# Patient Record
Sex: FEMALE | Race: WHITE | NOT HISPANIC OR LATINO | Employment: FULL TIME | URBAN - METROPOLITAN AREA
[De-identification: names, ages, dates, MRNs, and addresses within clinical notes are randomized per-mention and may not be internally consistent; named-entity substitution may affect disease eponyms.]

---

## 2023-08-09 LAB
ALANINE AMINOTRANSFERASE (SGPT) (U/L) IN SER/PLAS: 29 U/L (ref 7–45)
ALBUMIN (G/DL) IN SER/PLAS: 4.2 G/DL (ref 3.4–5)
ALKALINE PHOSPHATASE (U/L) IN SER/PLAS: 51 U/L (ref 33–110)
ANION GAP IN SER/PLAS: 12 MMOL/L (ref 10–20)
ASPARTATE AMINOTRANSFERASE (SGOT) (U/L) IN SER/PLAS: 24 U/L (ref 9–39)
BASOPHILS (10*3/UL) IN BLOOD BY AUTOMATED COUNT: 0.04 X10E9/L (ref 0–0.1)
BASOPHILS/100 LEUKOCYTES IN BLOOD BY AUTOMATED COUNT: 0.6 % (ref 0–2)
BILIRUBIN TOTAL (MG/DL) IN SER/PLAS: 0.7 MG/DL (ref 0–1.2)
CALCIUM (MG/DL) IN SER/PLAS: 8.9 MG/DL (ref 8.6–10.3)
CARBON DIOXIDE, TOTAL (MMOL/L) IN SER/PLAS: 24 MMOL/L (ref 21–32)
CHLORIDE (MMOL/L) IN SER/PLAS: 107 MMOL/L (ref 98–107)
CHOLESTEROL (MG/DL) IN SER/PLAS: 197 MG/DL (ref 0–199)
CHOLESTEROL IN HDL (MG/DL) IN SER/PLAS: 46.3 MG/DL
CHOLESTEROL/HDL RATIO: 4.3
CREATININE (MG/DL) IN SER/PLAS: 0.62 MG/DL (ref 0.5–1.05)
EOSINOPHILS (10*3/UL) IN BLOOD BY AUTOMATED COUNT: 0.17 X10E9/L (ref 0–0.7)
EOSINOPHILS/100 LEUKOCYTES IN BLOOD BY AUTOMATED COUNT: 2.4 % (ref 0–6)
ERYTHROCYTE DISTRIBUTION WIDTH (RATIO) BY AUTOMATED COUNT: 12.9 % (ref 11.5–14.5)
ERYTHROCYTE MEAN CORPUSCULAR HEMOGLOBIN CONCENTRATION (G/DL) BY AUTOMATED: 34.1 G/DL (ref 32–36)
ERYTHROCYTE MEAN CORPUSCULAR VOLUME (FL) BY AUTOMATED COUNT: 94 FL (ref 80–100)
ERYTHROCYTES (10*6/UL) IN BLOOD BY AUTOMATED COUNT: 4.12 X10E12/L (ref 4–5.2)
GFR FEMALE: >90 ML/MIN/1.73M2
GLUCOSE (MG/DL) IN SER/PLAS: 99 MG/DL (ref 74–99)
HEMATOCRIT (%) IN BLOOD BY AUTOMATED COUNT: 38.7 % (ref 36–46)
HEMOGLOBIN (G/DL) IN BLOOD: 13.2 G/DL (ref 12–16)
HEPATITIS C VIRUS AB PRESENCE IN SERUM: NONREACTIVE
HIV 1/ 2 AG/AB SCREEN: NONREACTIVE
IMMATURE GRANULOCYTES/100 LEUKOCYTES IN BLOOD BY AUTOMATED COUNT: 0.6 % (ref 0–0.9)
LDL: 102 MG/DL (ref 0–99)
LEUKOCYTES (10*3/UL) IN BLOOD BY AUTOMATED COUNT: 7.1 X10E9/L (ref 4.4–11.3)
LYMPHOCYTES (10*3/UL) IN BLOOD BY AUTOMATED COUNT: 2.24 X10E9/L (ref 1.2–4.8)
LYMPHOCYTES/100 LEUKOCYTES IN BLOOD BY AUTOMATED COUNT: 31.8 % (ref 13–44)
MONOCYTES (10*3/UL) IN BLOOD BY AUTOMATED COUNT: 0.42 X10E9/L (ref 0.1–1)
MONOCYTES/100 LEUKOCYTES IN BLOOD BY AUTOMATED COUNT: 6 % (ref 2–10)
NEUTROPHILS (10*3/UL) IN BLOOD BY AUTOMATED COUNT: 4.14 X10E9/L (ref 1.2–7.7)
NEUTROPHILS/100 LEUKOCYTES IN BLOOD BY AUTOMATED COUNT: 58.6 % (ref 40–80)
NON HDL CHOLESTEROL: 151 MG/DL
PLATELETS (10*3/UL) IN BLOOD AUTOMATED COUNT: 332 X10E9/L (ref 150–450)
POTASSIUM (MMOL/L) IN SER/PLAS: 4.2 MMOL/L (ref 3.5–5.3)
PROTEIN TOTAL: 6.9 G/DL (ref 6.4–8.2)
SODIUM (MMOL/L) IN SER/PLAS: 139 MMOL/L (ref 136–145)
THYROTROPIN (MIU/L) IN SER/PLAS BY DETECTION LIMIT <= 0.05 MIU/L: 1.22 MIU/L (ref 0.44–3.98)
TRIGLYCERIDE (MG/DL) IN SER/PLAS: 245 MG/DL (ref 0–149)
UREA NITROGEN (MG/DL) IN SER/PLAS: 13 MG/DL (ref 6–23)
VLDL: 49 MG/DL (ref 0–40)

## 2023-10-10 ENCOUNTER — TELEPHONE (OUTPATIENT)
Dept: RADIOLOGY | Facility: HOSPITAL | Age: 43
End: 2023-10-10
Payer: COMMERCIAL

## 2023-10-10 ENCOUNTER — HOSPITAL ENCOUNTER (OUTPATIENT)
Dept: RADIOLOGY | Facility: HOSPITAL | Age: 43
Discharge: HOME | End: 2023-10-10
Payer: COMMERCIAL

## 2023-10-10 DIAGNOSIS — R92.8 OTHER ABNORMAL AND INCONCLUSIVE FINDINGS ON DIAGNOSTIC IMAGING OF BREAST: ICD-10-CM

## 2023-10-10 DIAGNOSIS — R92.1 BREAST CALCIFICATION, LEFT: Primary | ICD-10-CM

## 2023-10-10 PROCEDURE — 77065 DX MAMMO INCL CAD UNI: CPT | Mod: LT

## 2023-10-10 PROCEDURE — 77061 BREAST TOMOSYNTHESIS UNI: CPT | Mod: LEFT SIDE

## 2023-10-10 PROCEDURE — 77065 DX MAMMO INCL CAD UNI: CPT | Mod: LEFT SIDE

## 2023-10-10 NOTE — TELEPHONE ENCOUNTER
After patient review of diagnostic results with Dr. Jones, referred to this RN for navigation assistance. Literature regarding abnormal breast imaging and breast biopsy including what to expect before, during, and after the procedure reviewed with the patient and sent to preferred email after call. All questions answered. Patient selected Dr. Neri for surgical consultation 10/17 0930 with biopsy to follow 10/18 1300. Appointment information provided and reviewed both verbally and sent to patient preferred email, to include provider information and how to reach me directly with questions or concerns before concluding visit.

## 2023-10-15 PROBLEM — K21.9 GASTROESOPHAGEAL REFLUX DISEASE WITHOUT ESOPHAGITIS: Status: ACTIVE | Noted: 2023-09-30

## 2023-10-15 PROBLEM — R92.8 ABNORMAL MAMMOGRAM: Status: ACTIVE | Noted: 2023-09-30

## 2023-10-15 PROBLEM — R09.89 CHOKING IN ADULT: Status: ACTIVE | Noted: 2023-09-30

## 2023-10-15 PROBLEM — Z90.710 S/P LAPAROSCOPIC ASSISTED VAGINAL HYSTERECTOMY (LAVH): Status: ACTIVE | Noted: 2017-12-21

## 2023-10-15 PROBLEM — R63.5 WEIGHT GAIN: Status: ACTIVE | Noted: 2023-08-29

## 2023-10-15 PROBLEM — D17.1 LIPOMA OF TORSO: Status: ACTIVE | Noted: 2023-08-29

## 2023-10-15 RX ORDER — FAMOTIDINE 20 MG/1
20 TABLET, FILM COATED ORAL 2 TIMES DAILY
COMMUNITY
Start: 2023-08-21 | End: 2024-04-30 | Stop reason: WASHOUT

## 2023-10-15 RX ORDER — OMEPRAZOLE 20 MG/1
20 CAPSULE, DELAYED RELEASE ORAL
COMMUNITY
Start: 2023-09-13 | End: 2023-10-17 | Stop reason: ALTCHOICE

## 2023-10-15 RX ORDER — FLUTICASONE PROPIONATE 50 MCG
2 SPRAY, SUSPENSION (ML) NASAL DAILY
COMMUNITY
Start: 2023-03-15 | End: 2023-10-17 | Stop reason: ALTCHOICE

## 2023-10-15 RX ORDER — NAPROXEN 500 MG/1
500 TABLET ORAL
COMMUNITY
Start: 2023-08-21 | End: 2023-10-17 | Stop reason: ALTCHOICE

## 2023-10-15 RX ORDER — ALBUTEROL SULFATE 90 UG/1
2 AEROSOL, METERED RESPIRATORY (INHALATION) EVERY 6 HOURS PRN
COMMUNITY
Start: 2022-12-15 | End: 2023-12-15

## 2023-10-15 RX ORDER — FLUTICASONE PROPIONATE 110 UG/1
1 AEROSOL, METERED RESPIRATORY (INHALATION) 2 TIMES DAILY
COMMUNITY
Start: 2023-03-15 | End: 2023-10-17 | Stop reason: ALTCHOICE

## 2023-10-15 RX ORDER — IBUPROFEN 600 MG/1
600 TABLET ORAL 3 TIMES DAILY PRN
COMMUNITY
Start: 2023-09-18 | End: 2023-11-17

## 2023-10-17 ENCOUNTER — OFFICE VISIT (OUTPATIENT)
Dept: SURGERY | Facility: CLINIC | Age: 43
End: 2023-10-17
Payer: COMMERCIAL

## 2023-10-17 VITALS
SYSTOLIC BLOOD PRESSURE: 130 MMHG | DIASTOLIC BLOOD PRESSURE: 87 MMHG | BODY MASS INDEX: 33.06 KG/M2 | HEART RATE: 97 BPM | OXYGEN SATURATION: 98 % | WEIGHT: 186.6 LBS | HEIGHT: 63 IN

## 2023-10-17 DIAGNOSIS — R92.8 ABNORMAL MAMMOGRAM: ICD-10-CM

## 2023-10-17 DIAGNOSIS — R92.1 CALCIFICATION OF LEFT BREAST: Primary | ICD-10-CM

## 2023-10-17 DIAGNOSIS — Z91.89 AT HIGH RISK FOR BREAST CANCER: ICD-10-CM

## 2023-10-17 DIAGNOSIS — R92.30 DENSE BREAST TISSUE: ICD-10-CM

## 2023-10-17 DIAGNOSIS — N63.22 MASS OF UPPER INNER QUADRANT OF LEFT BREAST: ICD-10-CM

## 2023-10-17 PROCEDURE — 99204 OFFICE O/P NEW MOD 45 MIN: CPT | Performed by: SURGERY

## 2023-10-17 PROCEDURE — 1036F TOBACCO NON-USER: CPT | Performed by: SURGERY

## 2023-10-17 ASSESSMENT — ENCOUNTER SYMPTOMS
VOMITING: 0
EYE PAIN: 0
CONFUSION: 0
BRUISES/BLEEDS EASILY: 0
ABDOMINAL PAIN: 0
WOUND: 0
FREQUENCY: 0
SPEECH DIFFICULTY: 0
EYE REDNESS: 0
MYALGIAS: 0
ARTHRALGIAS: 0
FATIGUE: 0
COUGH: 0
HEADACHES: 0
WEAKNESS: 0
SHORTNESS OF BREATH: 0
HEMATURIA: 0
FLANK PAIN: 0
CONSTIPATION: 0
AGITATION: 0
POLYPHAGIA: 0
NAUSEA: 0
DYSURIA: 0
CHILLS: 0
FEVER: 0
DIARRHEA: 0

## 2023-10-17 NOTE — PROGRESS NOTES
Subjective     Patient ID: Stacy Alexander is a 43 y.o. female who presents for New Patient Visit (Patient was referred by Dr. Tejeda for abnormal left breast mammogram. Patient states she has a lump that she is unsure how long it has been there. ).    RAFAEL Kumar is a 43-year-old white female who I am seen in consultation at the request of her primary care physician for evaluation of an abnormal left screening mammogram.  She currently denies any breast complaints.  No palpable masses, no breast tenderness and no nipple discharge.  However, she does note that she has been having anterior lower right rib pain which worsens when she takes a deep breath.  She reports having a chest x-ray which was negative, but continues to work with her primary care physician for this.  She states that she has been able to feel a small left breast lump just above and medial to her left nipple/areola.  She does not feel that it is growing in size.  She underwent a screening mammogram in September which identified a tight grouping of punctate calcifications just above and to the right of the left nipple.  She does have diffuse calcifications of the left breast as well.  A stereotactic biopsy was recommended after diagnostic mammogram confirmed the type grouping of punctate calcifications measuring approximately 0.6 cm.    Risk factors for breast cancer: 43-year-old white female; menarche at age 11; first live birth at age 28; no previous breast biopsy; mother was diagnosed with ER positive breast cancer when she was 71.  She also has a father with prostate cancer and a maternal aunt who had ovarian cancer in her mid 50s.  She is premenopausal (has undergone a FRANCO and unilateral salpingo-oophorectomy), and never took any hormone replacement therapy.  She had previously undergone genetic testing due to her son's illness and knows that she is BRCA negative.  She had been followed at a high risk breast clinic in [facility], but is now switching her  care over to .  This gives her a 5-year Kate score of 1.5% and a lifetime risk of 18.2% which overall puts her in a high risk category.  Using the [facility] Breast cancer risk evaluation tool, this patient's 10-year risk of breast cancer is 7.5% ( average risk is 2.1%) and lifetime risk is 32.8% (average lifetime risk is 10.4%). This puts her in a high risk category for developing breast cancer.    Review of Systems   Constitutional:  Negative for chills, fatigue and fever.   HENT:  Negative for congestion, ear pain and hearing loss.    Eyes:  Negative for pain and redness.   Respiratory:  Negative for cough and shortness of breath.    Cardiovascular:  Negative for chest pain and leg swelling.   Gastrointestinal:  Negative for abdominal pain, constipation, diarrhea, nausea and vomiting.   Endocrine: Negative for polyphagia.   Genitourinary:  Negative for dysuria, flank pain, frequency and hematuria.   Musculoskeletal:  Negative for arthralgias and myalgias.   Skin:  Negative for rash and wound.   Allergic/Immunologic: Negative for immunocompromised state.   Neurological:  Negative for speech difficulty, weakness and headaches.   Hematological:  Does not bruise/bleed easily.   Psychiatric/Behavioral:  Negative for agitation and confusion.          Objective   Physical Exam  General: Well-developed, well-nourished and in no acute distress.  Head: Normocephalic. Atraumatic.  Neck/thyroid: Neck is supple.   Eyes: Pupils equal round and reactive to light. Conjunctiva normal.  ENMT: No masses or deformity of external nose. External ears without masses.  Respiratory/Chest:  Normal respiratory effort.  Breast: Moderate sized breasts.  No palpable abnormality of the right.  Just above and medial to the left nipple there is an area of increased density with difficult to distinguish edges.  No erythema.  No nipple discharge.  Lymphatics: No palpable lymphadenopathy of the cervical, supraclavicular or axillary  regions.  Cardiovascular: Regular rate and rhythm.   Abdomen: Soft, nontender, nondistended.   Musculoskeletal: Joints and limbs are grossly normal. Normal gait. Normal range of motion of major joints.  Neuro: Oriented to person, place and time. No obvious neurological deficit. Motor strength grossly normal.  Psych: Normal mood and affect.      Radiology Resutls  Narrative & Impression   Interpreted By:  Raiza Jones,   STUDY:  BI MAMMO LEFT DIAGNOSTIC TOMOSYNTHESIS;  10/10/2023 10:08 am      ACCESSION NUMBER(S):  [id number]      ORDERING CLINICIAN:  RAIZA JONES      INDICATION:  Signs/Symptoms:CAT 0 LEFT. Left breast calcifications          COMPARISON:  08/21/2023 and 05/28/2020      FINDINGS:  2D and tomosynthesis images were reviewed at 1 mm slice thickness.      Density:  The breast tissue is heterogeneously dense, which may  obscure small masses.      A tight grouping of fine punctate calcifications is identified  slightly superior and medial to the nipple axis at anterior to middle  depth. Area involved by the calcifications is about 0.6 cm diameter.  There is no associated mass or architectural distortion. These  calcifications are distinct from the diffusely scattered punctate  calcifications. Surgical referral and biopsy are recommended. I  discussed my recommendation with the patient. Through our nurse  navigator arrangements were made for the patient to see Dr. Neri  10/17/2023 with biopsy 10/18/2023.      IMPRESSION:  Biopsy is recommended for a grouping of calcifications in the left  breast      BI-RADS CATEGORY:      BI-RADS Category:  4 Suspicious.  Recommendation:  Biopsy.  Recommended Date:  Immediate.  Laterality:  Left.      For any future breast imaging appointments, please call [phone]  ([phone]).          MACRO:  None      Signed by: Raiza Jones 10/10/2023 2:24 PM  Dictation workstation:   [id number]         Assessment/Plan   Diagnoses and all orders for this  "visit:  Calcification of left breast  Dense breast tissue  Abnormal mammogram  Mass of upper inner quadrant of left breast  At high risk for breast cancer    Oct 2023: LEFT; 0.6cm tight, grouping of punctate calcs just above and medial to nipple     1.  Reviewed with the patient that although she does have diffuse calcifications of the left breast, the tightly grouped punctate calcifications just above and medial to the left nipple are concerning for an underlying mass.  Therefore, agree with proceeding with the stereotactic biopsy scheduled for 10/18/2023.  The process of the biopsy was reviewed with the patient.  2.  She will follow-up with a phone call on 10/24/2023 to review the biopsy results.  If she does require surgical intervention, would still recommend mag seed lumpectomy as it is not clear that the area of \"palpable abnormality\" correlates with the underlying calcifications.  3.  She has high risk factors for breast cancer.  Her lifetime risk is 32.8%.  Therefore, she would qualify for more frequent screening with yearly mammograms and yearly breast MRIs alternating every 6 months.  We will hold on scheduling any follow-up at this time until the stereotactic biopsy has resulted.  Also reviewed with the patient regarding possible chemo preventative medications due to the high risk of breast cancer.  4.  She has already been seen by genetics due to her son's illness and has been tested negative for the BRCA gene per patient report.  5.  Because of her dense breast tissue, discussed the importance of self breast exams in the early detection of breast cancer.      "

## 2023-10-17 NOTE — PATIENT INSTRUCTIONS
1.  Keep your appointment for your mammogram guided left breast biopsy scheduled for 10/18/2023.  This biopsy is performed in the radiology department of [facility].  2.  If you have any problems with the biopsy site (bleeding or concern for infection), please contact Dr. Neri's office.  [phone]  3.  Follow-up with a virtual appointment with Dr. Neri on 10/24/2023 to review your biopsy results.  4.  Please read the booklet regarding high risk screening for breast cancer.  We will discuss possible breast MRI in 6 months for screening purposes.  5.  You are encouraged to do monthly self breast exams.  If you identify any abnormalities, please contact Dr. Neri's office immediately.

## 2023-10-17 NOTE — LETTER
"October 17, 2023     Fiona Tejeda MD  [id number]  [address]  [facility] [address]    Patient: Stacy Alexander   YOB: 1980   Date of Visit: 10/17/2023       Dear Dr. Fiona Tejeda MD:    Thank you for referring Stacy Alexander to me for evaluation. Below are the relevant portions of my assessment and plan of care.    Assessment / Plan:  Diagnoses and all orders for this visit:  Calcification of left breast  Dense breast tissue  Abnormal mammogram  Mass of upper inner quadrant of left breast  At high risk for breast cancer    Oct 2023: LEFT; 0.6cm tight, grouping of punctate calcs just above and medial to nipple     1.  Reviewed with the patient that although she does have diffuse calcifications of the left breast, the tightly grouped punctate calcifications just above and medial to the left nipple are concerning for an underlying mass.  Therefore, agree with proceeding with the stereotactic biopsy scheduled for 10/18/2023.  The process of the biopsy was reviewed with the patient.  2.  She will follow-up with a phone call on 10/24/2023 to review the biopsy results.  If she does require surgical intervention, would still recommend mag seed lumpectomy as it is not clear that the area of \"palpable abnormality\" correlates with the underlying calcifications.  3.  She has high risk factors for breast cancer.  Her lifetime risk is 32.8%.  Therefore, she would qualify for more frequent screening with yearly mammograms and yearly breast MRIs alternating every 6 months.  We will hold on scheduling any follow-up at this time until the stereotactic biopsy has resulted.  Also reviewed with the patient regarding possible chemo preventative medications due to the high risk of breast cancer.  4.  She has already been seen by genetics due to her son's illness and has been tested negative for the BRCA gene per patient report.  5.  Because of her dense breast tissue, discussed the importance of self breast exams in the early " detection of breast cancer.        If you have questions, please do not hesitate to call me. I look forward to following Stacy along with you.         Sincerely,        Merissa Neri MD        CC: No Recipients

## 2023-10-18 ENCOUNTER — HOSPITAL ENCOUNTER (OUTPATIENT)
Dept: RADIOLOGY | Facility: HOSPITAL | Age: 43
Discharge: HOME | End: 2023-10-18
Payer: COMMERCIAL

## 2023-10-18 DIAGNOSIS — R92.1 CALCIFICATION OF LEFT BREAST: ICD-10-CM

## 2023-10-18 DIAGNOSIS — R92.1 BREAST CALCIFICATION, LEFT: ICD-10-CM

## 2023-10-18 PROCEDURE — 77065 DX MAMMO INCL CAD UNI: CPT | Mod: LEFT SIDE | Performed by: RADIOLOGY

## 2023-10-18 PROCEDURE — 88305 TISSUE EXAM BY PATHOLOGIST: CPT | Mod: TC,SUR,PORLAB | Performed by: SURGERY

## 2023-10-18 PROCEDURE — 19081 BX BREAST 1ST LESION STRTCTC: CPT | Mod: LT

## 2023-10-18 PROCEDURE — 19081 BX BREAST 1ST LESION STRTCTC: CPT | Mod: LEFT SIDE | Performed by: RADIOLOGY

## 2023-10-18 PROCEDURE — 88305 TISSUE EXAM BY PATHOLOGIST: CPT | Mod: TC,PORLAB | Performed by: SURGERY

## 2023-10-18 PROCEDURE — 2720000007 HC OR 272 NO HCPCS

## 2023-10-18 PROCEDURE — 88305 TISSUE EXAM BY PATHOLOGIST: CPT | Performed by: STUDENT IN AN ORGANIZED HEALTH CARE EDUCATION/TRAINING PROGRAM

## 2023-10-18 PROCEDURE — 77065 DX MAMMO INCL CAD UNI: CPT

## 2023-10-24 ENCOUNTER — APPOINTMENT (OUTPATIENT)
Dept: SURGERY | Facility: CLINIC | Age: 43
End: 2023-10-24
Payer: COMMERCIAL

## 2023-10-25 ENCOUNTER — OFFICE VISIT (OUTPATIENT)
Dept: SURGERY | Facility: CLINIC | Age: 43
End: 2023-10-25
Payer: COMMERCIAL

## 2023-10-25 VITALS
OXYGEN SATURATION: 98 % | SYSTOLIC BLOOD PRESSURE: 127 MMHG | BODY MASS INDEX: 33.45 KG/M2 | HEART RATE: 83 BPM | DIASTOLIC BLOOD PRESSURE: 85 MMHG | HEIGHT: 63 IN | WEIGHT: 188.8 LBS

## 2023-10-25 DIAGNOSIS — R92.30 DENSE BREAST TISSUE: ICD-10-CM

## 2023-10-25 DIAGNOSIS — R92.8 ABNORMAL MAMMOGRAM: ICD-10-CM

## 2023-10-25 DIAGNOSIS — R92.1 CALCIFICATION OF LEFT BREAST: Primary | ICD-10-CM

## 2023-10-25 DIAGNOSIS — N63.22 MASS OF UPPER INNER QUADRANT OF LEFT BREAST: ICD-10-CM

## 2023-10-25 DIAGNOSIS — Z91.89 AT HIGH RISK FOR BREAST CANCER: ICD-10-CM

## 2023-10-25 DIAGNOSIS — T30.0 SKIN BURN: ICD-10-CM

## 2023-10-25 LAB
LABORATORY COMMENT REPORT: NORMAL
PATH REPORT.COMMENTS IMP SPEC: NORMAL
PATH REPORT.FINAL DX SPEC: NORMAL
PATH REPORT.GROSS SPEC: NORMAL
PATH REPORT.RELEVANT HX SPEC: NORMAL
PATH REPORT.TOTAL CANCER: NORMAL

## 2023-10-25 PROCEDURE — 99213 OFFICE O/P EST LOW 20 MIN: CPT | Performed by: SURGERY

## 2023-10-25 PROCEDURE — 1036F TOBACCO NON-USER: CPT | Performed by: SURGERY

## 2023-10-25 RX ORDER — SILVER SULFADIAZINE 10 G/1000G
CREAM TOPICAL 2 TIMES DAILY
Qty: 50 G | Refills: 3 | Status: SHIPPED | OUTPATIENT
Start: 2023-10-25 | End: 2023-11-24

## 2023-10-25 ASSESSMENT — ENCOUNTER SYMPTOMS
FLANK PAIN: 0
EYE PAIN: 0
VOMITING: 0
DYSURIA: 0
FATIGUE: 0
HEADACHES: 0
EYE REDNESS: 0
ABDOMINAL PAIN: 0
SPEECH DIFFICULTY: 0
CONSTIPATION: 0
BRUISES/BLEEDS EASILY: 0
AGITATION: 0
WOUND: 0
FREQUENCY: 0
MYALGIAS: 0
FEVER: 0
CHILLS: 0
DIARRHEA: 0
POLYPHAGIA: 0
WEAKNESS: 0
HEMATURIA: 0
NAUSEA: 0
ARTHRALGIAS: 0
CONFUSION: 0
COUGH: 0
SHORTNESS OF BREATH: 0

## 2023-10-25 NOTE — PROGRESS NOTES
Subjective     Patient ID: Stacy Alexander is a 43 y.o. female who presents for Follow-up (Patient has biopsy done 10-18.  Patient states left breast was burning blistered with bleeding. Patient states when she had the biopsy her nipple had discharge when it was smashed came out clear yellow greenish in color.).    HPI  Stacy returns to the office after undergoing a core needle biopsy for left breast calcifications.  She states that she applied the ice pack to the biopsy site for 20 minutes on and 20 minutes off after the biopsy.  She started to feel a burning sensation when the ice pack was in place, and quickly took this off.  Within 3 days after her biopsy, she had blisters around the biopsy site which eventually opened.  She has been placing a Neosporin like medication on the area with a moderate amount of drainage.  The pain is somewhat improved.    Risk factors for breast cancer: 43-year-old white female; menarche at age 11; first live birth at age 28; no previous breast biopsy; mother was diagnosed with ER positive breast cancer when she was 71.  She also has a father with prostate cancer and a maternal aunt who had ovarian cancer in her mid 50s.  She is premenopausal (has undergone a FRANCO and unilateral salpingo-oophorectomy), and never took any hormone replacement therapy.  She had previously undergone genetic testing due to her son's illness and knows that she is BRCA negative.  She had been followed at a high risk breast clinic in [facility], but is now switching her care over to .  This gives her a 5-year Kate score of 1.5% and a lifetime risk of 18.2% which overall puts her in a high risk category.  Using the [facility] Breast cancer risk evaluation tool, this patient's 10-year risk of breast cancer is 7.5% ( average risk is 2.1%) and lifetime risk is 32.8% (average lifetime risk is 10.4%). This puts her in a high risk category for developing breast cancer.    Review of Systems   Constitutional:  Negative  for chills, fatigue and fever.   HENT:  Negative for congestion, ear pain and hearing loss.    Eyes:  Negative for pain and redness.   Respiratory:  Negative for cough and shortness of breath.    Cardiovascular:  Negative for chest pain and leg swelling.   Gastrointestinal:  Negative for abdominal pain, constipation, diarrhea, nausea and vomiting.   Endocrine: Negative for polyphagia.   Genitourinary:  Negative for dysuria, flank pain, frequency and hematuria.   Musculoskeletal:  Negative for arthralgias and myalgias.   Skin:  Negative for rash and wound.   Allergic/Immunologic: Negative for immunocompromised state.   Neurological:  Negative for speech difficulty, weakness and headaches.   Hematological:  Does not bruise/bleed easily.   Psychiatric/Behavioral:  Negative for agitation and confusion.          Objective   Physical Exam  General: Well-developed, well-nourished and in no acute distress.  Head: Normocephalic. Atraumatic.  Neck/thyroid: Neck is supple.   Eyes: Pupils equal round and reactive to light. Conjunctiva normal.  ENMT: No masses or deformity of external nose. External ears without masses.  Respiratory/Chest:  Normal respiratory effort.  Breast: Moderate sized breasts.  No palpable abnormality of the right.  Just above and medial to the left nipple there are superficial open wounds measuring at least 3 x 4 cm (2 wounds) with a few smaller areas and some blistering.  No evidence of cellulitis.  Lymphatics: No palpable lymphadenopathy of the cervical, supraclavicular or axillary regions.  Cardiovascular: Regular rate and rhythm.   Abdomen: Soft, nontender, nondistended.   Musculoskeletal: Joints and limbs are grossly normal. Normal gait. Normal range of motion of major joints.  Neuro: Oriented to person, place and time. No obvious neurological deficit. Motor strength grossly normal.  Psych: Normal mood and affect.      Radiology Resutls  Narrative & Impression   Interpreted By:  Mabel Jones    STUDY:  BI MAMMO LEFT DIAGNOSTIC TOMOSYNTHESIS;  10/10/2023 10:08 am      ACCESSION NUMBER(S):  [id number]      ORDERING CLINICIAN:  RAIZA JONES      INDICATION:  Signs/Symptoms:CAT 0 LEFT. Left breast calcifications          COMPARISON:  08/21/2023 and 05/28/2020      FINDINGS:  2D and tomosynthesis images were reviewed at 1 mm slice thickness.      Density:  The breast tissue is heterogeneously dense, which may  obscure small masses.      A tight grouping of fine punctate calcifications is identified  slightly superior and medial to the nipple axis at anterior to middle  depth. Area involved by the calcifications is about 0.6 cm diameter.  There is no associated mass or architectural distortion. These  calcifications are distinct from the diffusely scattered punctate  calcifications. Surgical referral and biopsy are recommended. I  discussed my recommendation with the patient. Through our nurse  navigator arrangements were made for the patient to see Dr. Neri  10/17/2023 with biopsy 10/18/2023.      IMPRESSION:  Biopsy is recommended for a grouping of calcifications in the left  breast      BI-RADS CATEGORY:      BI-RADS Category:  4 Suspicious.  Recommendation:  Biopsy.  Recommended Date:  Immediate.  Laterality:  Left.      For any future breast imaging appointments, please call [phone]  ([phone]).          MACRO:  None      Signed by: Raiza Jones 10/10/2023 2:24 PM  Dictation workstation:   [id number]       Pathology  FINAL DIAGNOSIS   A.  Left breast, calcifications, stereotactic guided core needle biopsy:  - Apocrine metaplasia with associated calcifications. See comment.  - Usual ductal hyperplasia.   - Benign breast tissue with associated calcifications in benign ducts and lobules.   Electronically signed by Carissa Rivera MD on 10/25/2023 at 1029         Assessment/Plan   Diagnoses and all orders for this visit:  Calcification of left breast  At high risk for breast cancer  -     MR breast  "bilateral w contrast full protocol; Future  -     tamoxifen (Nolvadex) 20 mg tablet; Take 1 tablet (20 mg total) by mouth once daily.  Take with water or any other nonalcoholic drink with or without food at around the same time(s) every day.  Dense breast tissue  Mass of upper inner quadrant of left breast  Abnormal mammogram  Skin burn  -     silver sulfADIAZINE (Silvadene) 1 % cream; Apply topically 2 times a day.    Oct 2023: LEFT; 0.6cm tight, grouping of punctate calcs just above and medial to nipple; biopsy benign.  Started on tamoxifen for chemo preventative purposes 10/26/2023.  We will keep on tamoxifen for 5 years.     1.  Reviewed with the patient that although she does have diffuse calcifications of the left breast, the tightly grouped punctate calcifications just above and medial to the left nipple are concerning for an underlying mass.  She had undergone a stereotactic biopsy of these calcifications with a benign diagnosis.  Since she qualifies for increased frequency of radiographic evaluation due to her high risk factors for breast cancer, this area of calcifications will continue to be monitored closely.  2.  It appears that she has a \"ice burn\" to the area of the upper outer quadrant of the left breast.  She will place Silvadene ointment and a dry gauze/tape dressing to the area twice a day until healed.  3.  She has high risk factors for breast cancer.  Her lifetime risk is 32.8%.  Therefore, she would qualify for more frequent screening with yearly mammograms and yearly breast MRIs alternating every 6 months.  Schedule for breast MRI in 6 months and follow-up in the office after this MRI.  4.  She has already been seen by genetics due to her son's illness and has been tested negative for the BRCA gene per patient report.  5.  Because of her dense breast tissue, discussed the importance of self breast exams in the early detection of breast cancer.  6.  With her increased risk factors, she does " want to take chemo preventative medication.  We will start on tamoxifen 20 mg daily.  Side effects of tamoxifen were reviewed with the patient.  She has previously undergone a FRANCO in unilateral salpingo oophorectomy which will limit her risk of uterine cancer.  She was encouraged to contact the office if she has any other side effects.

## 2023-10-25 NOTE — PATIENT INSTRUCTIONS
1.  You are encouraged to do monthly self breast exams.  If you identify any abnormalities, please contact Dr. Neri's office immediately.  [phone]  2.  The biopsy of the left breast calcifications is benign.  We will plan for 6-month follow-up radiographic evaluation.  Usually a diagnostic mammogram is performed.  However, since you have high risk factors for developing breast cancer, you will actually be scheduled for an MRI of your breast in 6 months.  Follow-up in Dr. Neri's office after this MRI.  3.  To help with the skin burn, Silvadene ointment has been sent to your pharmacy.  Place a small amount of the Silvadene on the burn area twice a day and cover with a dry gauze dressing.  Use as minimal tape as possible to keep the gauze in place.  You may remove the gauze/tape dressing before showering.  You will need to use this for the next 1 to 2 weeks until the wound is healed.  4.  To help reduce your risk of developing breast cancer, tamoxifen medication will be sent to your pharmacy.  This is a medication that you will take daily for 5 years.  If you start to have any side effects with this medication, please notify Dr. Neri's office immediately.

## 2023-10-26 RX ORDER — TAMOXIFEN CITRATE 20 MG/1
20 TABLET ORAL DAILY
Qty: 30 TABLET | Refills: 11 | Status: SHIPPED | OUTPATIENT
Start: 2023-10-26 | End: 2024-04-30 | Stop reason: WASHOUT

## 2024-04-23 ENCOUNTER — HOSPITAL ENCOUNTER (OUTPATIENT)
Dept: RADIOLOGY | Facility: HOSPITAL | Age: 44
Discharge: HOME | End: 2024-04-23
Payer: COMMERCIAL

## 2024-04-23 DIAGNOSIS — Z91.89 AT HIGH RISK FOR BREAST CANCER: ICD-10-CM

## 2024-04-23 PROCEDURE — 2550000001 HC RX 255 CONTRASTS: Performed by: SURGERY

## 2024-04-23 PROCEDURE — 77049 MRI BREAST C-+ W/CAD BI: CPT

## 2024-04-23 PROCEDURE — 77049 MRI BREAST C-+ W/CAD BI: CPT | Performed by: RADIOLOGY

## 2024-04-23 PROCEDURE — A9575 INJ GADOTERATE MEGLUMI 0.1ML: HCPCS | Performed by: SURGERY

## 2024-04-23 RX ORDER — GADOTERATE MEGLUMINE 376.9 MG/ML
0.2 INJECTION INTRAVENOUS
Status: COMPLETED | OUTPATIENT
Start: 2024-04-23 | End: 2024-04-23

## 2024-04-23 RX ADMIN — GADOTERATE MEGLUMINE 16.5 ML: 376.9 INJECTION INTRAVENOUS at 13:50

## 2024-04-30 ENCOUNTER — OFFICE VISIT (OUTPATIENT)
Dept: SURGERY | Facility: CLINIC | Age: 44
End: 2024-04-30
Payer: COMMERCIAL

## 2024-04-30 VITALS
HEART RATE: 99 BPM | SYSTOLIC BLOOD PRESSURE: 121 MMHG | HEIGHT: 63 IN | DIASTOLIC BLOOD PRESSURE: 86 MMHG | BODY MASS INDEX: 31.89 KG/M2 | OXYGEN SATURATION: 95 %

## 2024-04-30 DIAGNOSIS — R92.30 DENSE BREAST TISSUE: ICD-10-CM

## 2024-04-30 DIAGNOSIS — N63.22 MASS OF UPPER INNER QUADRANT OF LEFT BREAST: ICD-10-CM

## 2024-04-30 DIAGNOSIS — R92.1 CALCIFICATION OF LEFT BREAST: ICD-10-CM

## 2024-04-30 DIAGNOSIS — Z13.71 BRCA NEGATIVE: ICD-10-CM

## 2024-04-30 DIAGNOSIS — Z12.31 ENCOUNTER FOR SCREENING MAMMOGRAM FOR BREAST CANCER: ICD-10-CM

## 2024-04-30 DIAGNOSIS — Z91.89 AT HIGH RISK FOR BREAST CANCER: Primary | ICD-10-CM

## 2024-04-30 PROCEDURE — 99213 OFFICE O/P EST LOW 20 MIN: CPT | Performed by: SURGERY

## 2024-04-30 PROCEDURE — 1036F TOBACCO NON-USER: CPT | Performed by: SURGERY

## 2024-04-30 RX ORDER — TAMOXIFEN CITRATE 20 MG/1
20 TABLET ORAL DAILY
Qty: 90 TABLET | Refills: 3 | Status: SHIPPED | OUTPATIENT
Start: 2024-04-30 | End: 2025-04-30

## 2024-04-30 NOTE — PROGRESS NOTES
"    GENERAL SURGERY OFFICE NOTE    Patient: Stacy Alexander    Age: 43 y.o.   Gender: female    MRN: [id number]    PCP: Fiona Tejeda MD        SUBJECTIVE     Chief Complaint  Follow-up (Patient is here for a 6 month bilateral breast follow up. Patient states that she is not currently having any problems. )       RAFAEL Kumar returns to the office for a 6-month follow-up of her left breast calcifications and increased risk of developing breast cancer.  After her last office visit, she did use the Silvadene to the ice burn of the left breast.  This has subsequently healed.  She has started the tamoxifen for her increased Kate score.  She initially had some hot sensations and felt \"yucky\" with some fatigue.  She stopped the medications for about 3 to 4 weeks and felt better.  She resumed the medication and does not seem to have any further side effects.  She currently denies any breast complaints.  No palpable masses, skin changes or nipple discharge.  She recently had her breast MRI which did not show any concerns for malignancy.     Risk factors for breast cancer: 43-year-old white female; menarche at age 11; first live birth at age 28; no previous breast biopsy; mother was diagnosed with ER positive breast cancer when she was 71.  She also has a father with prostate cancer and a maternal aunt who had ovarian cancer in her mid 50s.  She is premenopausal (has undergone a FRANCO and unilateral salpingo-oophorectomy), and never took any hormone replacement therapy.  She had previously undergone genetic testing due to her son's illness and knows that she is BRCA negative.  She had been followed at a high risk breast clinic in [facility], but is now switching her care over to .  This gives her a 5-year Kate score of 1.5% and a lifetime risk of 18.2% which overall puts her in a high risk category.  Using the Tyrer-Cuzick Breast cancer risk evaluation tool, this patient's 10-year risk of breast cancer is 7.5% ( average risk is 2.1%) " and lifetime risk is 32.8% (average lifetime risk is 10.4%). This puts her in a high risk category for developing breast cancer.    ROS  Review of Systems   Constitutional:  Negative for chills, fatigue and fever.   HENT:  Negative for congestion, ear pain and hearing loss.    Eyes:  Negative for pain and redness.   Respiratory:  Negative for cough and shortness of breath.    Cardiovascular:  Negative for chest pain and leg swelling.   Gastrointestinal:  Negative for abdominal pain, constipation, diarrhea, nausea and vomiting.   Endocrine: Negative for polyphagia.   Genitourinary:  Negative for dysuria, flank pain, frequency and hematuria.   Musculoskeletal:  Negative for arthralgias and myalgias.   Skin:  Negative for rash and wound.   Allergic/Immunologic: Negative for immunocompromised state.   Neurological:  Negative for speech difficulty, weakness and headaches.   Hematological:  Does not bruise/bleed easily.   Psychiatric/Behavioral:  Negative for agitation and confusion.      HISTORY     Past Medical History:   Diagnosis Date    Delayed emergence from general anesthesia 2011    PONV (postoperative nausea and vomiting) 2011        Past Surgical History:   Procedure Laterality Date    BI STEREOTACTIC GUIDED BREAST LEFT LOCALIZATION AND BIOPSY Left 10/18/2023    BI STEREOSTATIC GUIDED BREAST LEFT LOCALIZATION AND BIOPSY 10/18/2023 Diego Falcon MD [facility]    BLADDER SURGERY      x3    CHOLECYSTECTOMY      HERNIA REPAIR      HYSTERECTOMY      LIPOMA RESECTION      x2        Family History   Problem Relation Name Age of Onset    Breast cancer Mother Radha     Asthma Mother Radha     Hyperlipidemia Mother Radha     Hypertension Mother Radha     Ovarian cancer Mother's Sister      Cancer Father Darion     Hearing loss Father Darion     Heart disease Father Darion     Hyperlipidemia Father Darion     Hypertension Father Darion     Genetic Testing Daughter Emilia     Learning disabilities Daughter Emilia     BRCA1  Negative Neg Hx      BRCA2 Negative Neg Hx          Allergies   Allergen Reactions    Morphine Other and Unknown     SPHINCTER OF ODDI SPASMS, SEVERE PAIN, ALLEVIATED WITH DILAUDID   Other reaction(s): Other (See Comments), Unknown   SPHINCTER OF ODDI SPASMS, SEVERE PAIN, ALLEVIATED WITH DILAUDID    Other reaction(s): Other (See Comments), Unknown   SPHINCTER OF ODDI SPASMS, SEVERE PAIN, ALLEVIATED WITH DILAUDID    SPHINCTER OF ODDI SPASMS, SEVERE PAIN, ALLEVIATED WITH DILAUDID    Oxycodone Unknown     SPHINCTER OF ODDI SPASMS, SEVERE PAIN, ALLEVIATED WITH DILAUDID   SPHINCTER OF ODDI SPASMS, SEVERE PAIN, ALLEVIATED WITH DILAUDID    SPHINCTER OF ODDI SPASMS, SEVERE PAIN, ALLEVIATED WITH DILAUDID        Social History     Tobacco Use   Smoking Status Former    Types: Cigarettes   Smokeless Tobacco Never        Social History     Substance and Sexual Activity   Alcohol Use Yes    Alcohol/week: 1.0 - 2.0 standard drink of alcohol    Types: 1 - 2 Standard drinks or equivalent per week    Comment: occasional        HOME MEDICATIONS  Current Outpatient Medications   Medication Instructions    albuterol 90 mcg/actuation inhaler 2 puffs, inhalation, Every 6 hours PRN    famotidine (PEPCID) 20 mg, oral, 2 times daily    tamoxifen (NOLVADEX) 20 mg, oral, Daily, Take with water or any other nonalcoholic drink with or without food at around the same time(s) every day.          OBJECTIVE   Last Recorded Vitals.  There were no vitals taken for this visit.     PHYSICAL EXAM  Physical Exam   General: Well-developed, well-nourished and in no acute distress.  Head: Normocephalic. Atraumatic.  Neck/thyroid: Neck is supple.   Eyes: Pupils equal round and reactive to light. Conjunctiva normal.  ENMT: No masses or deformity of external nose. External ears without masses.  Respiratory/Chest:  Normal respiratory effort.  Breast: Moderate sized breasts.  No palpable abnormality of the right.  Just above and medial to the left nipple the is  slight discoloration consistent with previous wound.  No open wound.  No palpable mass.  Lymphatics: No palpable lymphadenopathy of the cervical, supraclavicular or axillary regions.  Cardiovascular: Regular rate and rhythm.   Abdomen: Soft, nontender, nondistended.   Musculoskeletal: Joints and limbs are grossly normal. Normal gait. Normal range of motion of major joints.  Neuro: Oriented to person, place and time. No obvious neurological deficit. Motor strength grossly normal.  Psych: Normal mood and affect.     RESULTS   Labs  Pathology  FINAL DIAGNOSIS   A.  Left breast, calcifications, stereotactic guided core needle biopsy:  - Apocrine metaplasia with associated calcifications. See comment.  - Usual ductal hyperplasia.   - Benign breast tissue with associated calcifications in benign ducts and lobules.   Electronically signed by Carissa Rivera MD on 10/25/2023 at 1029       [facility]  MR BREAST BILATERAL WITH CONTRAST FULL PROTOCOL;  4/23/2024 2:02 pm      ACCESSION NUMBER(S):  [id number]      ORDERING CLINICIAN:  AAMNDA VALLES      INDICATION:  High-risk screening. Family history of breast cancer diagnosed in her  mother in her 40s and 70s. Dense breast tissue on mammography.      COMPARISON:  Mammogram 08/21/2023.      TECHNIQUE:  Using a dedicated breast coil, STIR axial and T1-weighted fat  saturation axial images of the breasts were obtained, the latter both  before and after intravenous administration of Gadolinium DTPA. On an  independent workstation, 3-D images were formulated using [employer]  including time enhancement curves, subtraction images and MIP images.      Intravenous contrast:  16.5 mL of Dotarem      FINDINGS:  There is symmetric moderate bilateral background enhancement.      Extreme fibroglandular tissue.      RIGHT BREAST:  No suspicious mass or nonmass enhancement is  identified.      No axillary or internal mammary lymphadenopathy is appreciated.      LEFT BREAST:  No  suspicious mass or nonmass enhancement is identified.      No axillary or internal mammary lymphadenopathy is appreciated.      NON-BREAST FINDINGS:  There is a nonenhancing STIR hyperintense left  hepatic lobe lesion too small to characterize measuring 0.3 cm      IMPRESSION:  No MRI evidence of malignancy in either breast.      BI-RADS CATEGORY:  BI-RADS Category:  1 Negative.  Recommendation:  Annual Screening.  Recommended Date:  1 Year.  Laterality:  Bilateral.      ASSESSMENT / PLAN   Diagnoses and all orders for this visit:  At high risk for breast cancer  -     tamoxifen (Nolvadex) 20 mg tablet; Take 1 tablet (20 mg total) by mouth once daily.  Take with water or any other nonalcoholic drink with or without food at around the same time(s) every day.  Calcification of left breast  Mass of upper inner quadrant of left breast  Dense breast tissue  BRCA negative  Encounter for screening mammogram for breast cancer  -     BI mammo bilateral screening tomosynthesis; Future      Plan  Oct 2023: LEFT; 0.6cm tight, grouping of punctate calcs just above and medial to nipple; biopsy benign.  Started on tamoxifen for chemo preventative purposes 10/25/2023.  We will keep on tamoxifen for 5 years.      1.  She does have diffuse calcifications of the left breast, but the tightly grouped punctate calcifications just above and medial to the left nipple were concerning for an underlying mass.  She had undergone a stereotactic biopsy of these calcifications with a benign diagnosis.  Since she qualifies for increased frequency of radiographic evaluation due to her high risk factors for breast cancer, this area of calcifications will continue to be monitored closely.  2.  he has high risk factors for breast cancer.  Her lifetime risk is 32.8%.  Therefore, she would qualify for more frequent screening with yearly mammograms and yearly breast MRIs alternating every 6 months.  Recent breast MRI shows no concerns for underlying  malignancy.  Will schedule for bilateral mammogram in 6 months and follow-up in the office after this mammogram.  4.  She has already been seen by genetics due to her son's illness and has been tested negative for the BRCA gene per patient report.  5.  Because of her dense breast tissue, discussed the importance of self breast exams in the early detection of breast cancer.  6.  With her increased risk factors, she does want to take chemo preventative medication.  Started on tamoxifen 20 mg daily on 10/25/2023.  Plan to treat for 5 years..  Side effects of tamoxifen were reviewed with the patient.  She has previously undergone a FRANCO in unilateral salpingo oophorectomy which will limit her risk of uterine cancer.  She was encouraged to contact the office if she has any other side effects.      Merissa Neri MD, FACS  [facility] General Surgery  [address] [place] [address];   [address]; [address]  [place]  [address] [phone]

## 2024-04-30 NOTE — PATIENT INSTRUCTIONS
1.  You are encouraged to do monthly self breast exams.  If you identify any abnormalities, please contact Dr. Neri's office immediately.  [phone]  2.  Due to your increased risk factors of developing breast cancer, you will continue to get a radiographic evaluation of your breast every 6 months.  Your most recent breast MRI does not show any concerns.  You will be scheduled for mammogram of both your breast in 6 months.  Follow-up in Dr. Neri's office after this mammogram.  3.  To help reduce your risk of developing breast cancer, tamoxifen medication will be sent to your pharmacy.  This is a medication that you will take daily for 5 years.  If you start to have any side effects with this medication, please notify Dr. Neri's office immediately.

## 2024-10-25 ENCOUNTER — APPOINTMENT (OUTPATIENT)
Dept: RADIOLOGY | Facility: CLINIC | Age: 44
End: 2024-10-25
Payer: COMMERCIAL

## 2024-10-29 ENCOUNTER — APPOINTMENT (OUTPATIENT)
Dept: SURGERY | Facility: CLINIC | Age: 44
End: 2024-10-29
Payer: COMMERCIAL

## 2024-10-30 ENCOUNTER — APPOINTMENT (OUTPATIENT)
Dept: SURGERY | Facility: CLINIC | Age: 44
End: 2024-10-30
Payer: COMMERCIAL